# Patient Record
(demographics unavailable — no encounter records)

---

## 2025-03-12 NOTE — PHYSICAL EXAM
[de-identified] : The patient is sitting comfortably in the exam room. LEFT ankle -Skin is intact, no swelling, no ecchymosis -Incision is well-healed, no erythema, no signs of infection -No pain with palpation over the medial malleolus -No pain with palpation over the dorsum of the foot, no plantar ecchymoses, no pain with movement of the first metatarsal relative to the second metatarsal -No subluxation of the peroneal tendons -Dorsiflexion:5, Plantarflexion: 45 -Sensation is intact L1-S1 -5/5 EHL, FHL, TA, GS -Foot is warm and well-perfused, palpable dorsalis pedis pulse. [de-identified] : X-rays of the left ankle were taken in the office today, 3/12/25. AP Mortise and Lateral. X-rays show good overall alignment of the ankle. Patient is status post ORIF distal fibula with placement of a tight rope. Mortise is well aligned and the syndesmosis is well reduced.  There is some bone formation at the syndesmosis.  There is interval healing and remodeling.

## 2025-03-12 NOTE — DISCUSSION/SUMMARY
[de-identified] : 77-year-old gentleman s/p ORIF left bimalleolar ankle fracture with syndesmotic fixation, approximately 1 year and 2 weeks out.  -X-ray and physical exam findings were discussed with the patient -WBAT left LE  -Physical therapy: work on ROM of the left ankle -Follow up as needed with x-rays of the left ankle at that time. -All the patient's questions and concerns were addressed during this visit.

## 2025-03-12 NOTE — HISTORY OF PRESENT ILLNESS
[de-identified] : Mr. GO BUTLER is a 77 year old gentleman presenting for follow up appointment s/p ORIF left bimalleolar ankle fracture with syndesmotic fixation on 1/23/24. Since his last visit he states he is feeling great.  He occasionally has some swelling but overall he is pain-free and happy with his care.

## 2025-03-12 NOTE — HISTORY OF PRESENT ILLNESS
[de-identified] : Mr. GO BUTLER is a 77 year old gentleman presenting for follow up appointment s/p ORIF left bimalleolar ankle fracture with syndesmotic fixation on 1/23/24. Since his last visit he states he is feeling great.  He occasionally has some swelling but overall he is pain-free and happy with his care.

## 2025-03-12 NOTE — REASON FOR VISIT
[Follow-Up Visit] : a follow-up visit for [FreeTextEntry2] : s/p ORIF left bimalleolar ankle fracture with syndesmotic fixation, DOS: 1/23/24.

## 2025-03-12 NOTE — PHYSICAL EXAM
[de-identified] : The patient is sitting comfortably in the exam room. LEFT ankle -Skin is intact, no swelling, no ecchymosis -Incision is well-healed, no erythema, no signs of infection -No pain with palpation over the medial malleolus -No pain with palpation over the dorsum of the foot, no plantar ecchymoses, no pain with movement of the first metatarsal relative to the second metatarsal -No subluxation of the peroneal tendons -Dorsiflexion:5, Plantarflexion: 45 -Sensation is intact L1-S1 -5/5 EHL, FHL, TA, GS -Foot is warm and well-perfused, palpable dorsalis pedis pulse. [de-identified] : X-rays of the left ankle were taken in the office today, 3/12/25. AP Mortise and Lateral. X-rays show good overall alignment of the ankle. Patient is status post ORIF distal fibula with placement of a tight rope. Mortise is well aligned and the syndesmosis is well reduced.  There is some bone formation at the syndesmosis.  There is interval healing and remodeling.

## 2025-03-12 NOTE — DISCUSSION/SUMMARY
[de-identified] : 77-year-old gentleman s/p ORIF left bimalleolar ankle fracture with syndesmotic fixation, approximately 1 year and 2 weeks out.  -X-ray and physical exam findings were discussed with the patient -WBAT left LE  -Physical therapy: work on ROM of the left ankle -Follow up as needed with x-rays of the left ankle at that time. -All the patient's questions and concerns were addressed during this visit.